# Patient Record
Sex: MALE | Race: WHITE | Employment: FULL TIME | URBAN - METROPOLITAN AREA
[De-identification: names, ages, dates, MRNs, and addresses within clinical notes are randomized per-mention and may not be internally consistent; named-entity substitution may affect disease eponyms.]

---

## 2023-01-03 ENCOUNTER — OFFICE VISIT (OUTPATIENT)
Dept: UROLOGY | Age: 43
End: 2023-01-03

## 2023-01-03 DIAGNOSIS — Z30.09 ENCOUNTER FOR VASECTOMY COUNSELING: Primary | ICD-10-CM

## 2023-01-03 RX ORDER — DIAZEPAM 10 MG/1
TABLET ORAL
Qty: 1 TABLET | Refills: 0 | Status: SHIPPED | OUTPATIENT
Start: 2023-01-03 | End: 2024-01-03

## 2023-01-03 ASSESSMENT — ENCOUNTER SYMPTOMS
BACK PAIN: 0
WHEEZING: 0
VOMITING: 0
COUGH: 0
SHORTNESS OF BREATH: 0
NAUSEA: 0
ABDOMINAL PAIN: 0
INDIGESTION: 0
BLOOD IN STOOL: 0
SKIN LESIONS: 0
CONSTIPATION: 0
EYE DISCHARGE: 0
EYE PAIN: 0
DIARRHEA: 0
HEARTBURN: 0

## 2023-01-03 NOTE — PROGRESS NOTES
[facility]  [address] [facility]    [address]  [place], [address] [facility]  [phone]          Agapito Estevez  : 1980    No chief complaint on file. HPI     Agapito Estevez is a 38 y.o. male referred for desire for sterilization. He is . He has 2 biologic children, ages 6yo and 10yo. He is in a monogamous relationship. Patient is currently using wife's ocp's in regards to birth control. Patient can think of no other instigating or exacerbating events. History reviewed. No pertinent past medical history. History reviewed. No pertinent surgical history. Current Outpatient Medications   Medication Sig Dispense Refill    diazePAM (VALIUM) 10 MG tablet Take 1 tablet by mouth for one dose, 45 minutes prior to vasectomy 1 tablet 0     No current facility-administered medications for this visit. Not on File  Social History     Socioeconomic History    Marital status:      Spouse name: Not on file    Number of children: Not on file    Years of education: Not on file    Highest education level: Not on file   Occupational History    Not on file   Tobacco Use    Smoking status: Not on file    Smokeless tobacco: Not on file   Substance and Sexual Activity    Alcohol use: Not on file    Drug use: Not on file    Sexual activity: Not on file   Other Topics Concern    Not on file   Social History Narrative    Not on file     Social Determinants of Health     Financial Resource Strain: Not on file   Food Insecurity: Not on file   Transportation Needs: Not on file   Physical Activity: Not on file   Stress: Not on file   Social Connections: Not on file   Intimate Partner Violence: Not on file   Housing Stability: Not on file     History reviewed. No pertinent family history. Review of Systems  Constitutional:   Negative for fever, chills, appetite change, malaise/fatigue, headaches and weight loss. Skin:  Negative for skin lesions, rash and itching.   Eyes:  Negative for visual disturbance, eye pain and eye discharge. ENT:  Negative for difficulty articulating words, pain swallowing, high frequency hearing loss and dry mouth. Respiratory:  Negative for cough, blood in sputum, shortness of breath and wheezing. Cardiovascular:  Negative for chest pain, hypertension, irregular heartbeat, leg pain, leg swelling, regular rate and rhythm and varicose veins. GI:  Negative for nausea, vomiting, abdominal pain, blood in stool, constipation, diarrhea, indigestion and heartburn. Genitourinary:  Negative for urinary burning, hematuria, flank pain, recurrent UTIs, history of urolithiasis, nocturia, slower stream, straining, urgency, leakage w/ urge, frequent urination, incomplete emptying, erectile dysfunction, testicular pain, sexually transmitted disease, discharge and urethral stricture. Musculoskeletal:  Negative for back pain, bone pain, arthralgias, tenderness, muscle weakness and neck pain. Neurological:  Negative for dizziness, focal weakness, numbness, seizures and tremors. Psychological:  Negative for depression and psychiatric problem. Endocrine:  Negative for cold intolerance, thirst, excessive urination, fatigue and heat intolerance. Hem/Lymphatic:  Negative for easy bleeding, easy bruising and frequent infections. There were no vitals taken for this visit. GENERAL: NAD, ALERT, A&O x 3, GAIT NORMAL  ABDOMEN: soft, non tender, non distended, + bowel sounds, no organomegaly, no palpable masses  : testes descended bilaterally, no phallic or scrotal lesions, B vas palpable  NEUROLOGIC: cranial nerves 2-12 grossly intact           Assessment and Plan    ICD-10-CM    1. Encounter for vasectomy counseling  Z30.09 diazePAM (VALIUM) 10 MG tablet          I have discussed the risks and benefits of bilateral vasectomy with the patient today. Risks discussed included chance of chronic pain, recanalization, hematoma or bleeding, and infection.   We also discussed that this MUST be considered a permanent procedure as reversal is not always an option or successful. I also stressed that, in terms of birth control, he must not begin having unprotected sexual intercourse until a semen sample has been checked microscopically with no sperm present. We usually check this 1st at week 7 after vasectomy. I prescribed a 10mg Valium tablet to take 45 minutes prior to vasectomy and he has been educated as to the need for a  to and from the procedure.

## 2023-03-15 ENCOUNTER — OFFICE VISIT (OUTPATIENT)
Dept: UROLOGY | Age: 43
End: 2023-03-15

## 2023-03-15 VITALS — WEIGHT: 195 LBS | HEIGHT: 72 IN | BODY MASS INDEX: 26.41 KG/M2

## 2023-03-15 DIAGNOSIS — Z30.2 ENCOUNTER FOR VASECTOMY: Primary | ICD-10-CM

## 2023-03-15 RX ORDER — DIAZEPAM 5 MG/1
10 TABLET ORAL ONCE
COMMUNITY
Start: 2023-01-03

## 2023-03-15 ASSESSMENT — ENCOUNTER SYMPTOMS: NAUSEA: 0

## 2023-03-15 NOTE — PROGRESS NOTES
[facility]  [address] [facility]    [address]  [place], [address] [facility]  [phone]          Hermelinda Rachel  : 1980    No chief complaint on file. HPI     Hermelinda Rachel is a 38 y.o. male          No past medical history on file. No past surgical history on file. Current Outpatient Medications   Medication Sig Dispense Refill    diazePAM (VALIUM) 10 MG tablet Take 1 tablet by mouth for one dose, 45 minutes prior to vasectomy 1 tablet 0     No current facility-administered medications for this visit. Not on File  Social History     Socioeconomic History    Marital status:      Spouse name: Not on file    Number of children: Not on file    Years of education: Not on file    Highest education level: Not on file   Occupational History    Not on file   Tobacco Use    Smoking status: Not on file    Smokeless tobacco: Not on file   Substance and Sexual Activity    Alcohol use: Not on file    Drug use: Not on file    Sexual activity: Not on file   Other Topics Concern    Not on file   Social History Narrative    Not on file     Social Determinants of Health     Financial Resource Strain: Not on file   Food Insecurity: Not on file   Transportation Needs: Not on file   Physical Activity: Not on file   Stress: Not on file   Social Connections: Not on file   Intimate Partner Violence: Not on file   Housing Stability: Not on file     No family history on file. Review of Systems  Constitutional:   Negative for fever. GI:  Negative for nausea. Genitourinary:  Negative for flank pain. Urinalysis  UA - Dipstick  No results found for this or any previous visit. UA - Micro  WBC - 0  RBC - 0  Bacteria - 0  Epith - 0        Assessment and Plan  No diagnosis found.

## 2023-03-15 NOTE — PROGRESS NOTES
[facility]  [address] [place] [address] Way  [address] [facility], [address] [facility]  [phone]    Ana Pop  : 1980         HPI   38 y.o., male referred for desire for sterilization. He is . He has 2 biologic children, ages 8yo and 10yo. He is in a monogamous relationship. Patient is currently using wife's ocp's in regards to birth control. History reviewed. No pertinent past medical history. History reviewed. No pertinent surgical history. Current Outpatient Medications   Medication Sig Dispense Refill    diazePAM (VALIUM) 10 MG tablet Take 1 tablet by mouth for one dose, 45 minutes prior to vasectomy 1 tablet 0    diazePAM (VALIUM) 5 MG tablet Take 10 mg by mouth once. No current facility-administered medications for this visit. Allergies   Allergen Reactions    Penicillins      Other reaction(s): Hives/Swelling-Allergy     Social History     Socioeconomic History    Marital status:      Spouse name: Not on file    Number of children: Not on file    Years of education: Not on file    Highest education level: Not on file   Occupational History    Not on file   Tobacco Use    Smoking status: Not on file    Smokeless tobacco: Not on file   Substance and Sexual Activity    Alcohol use: Not on file    Drug use: Not on file    Sexual activity: Not on file   Other Topics Concern    Not on file   Social History Narrative    Not on file     Social Determinants of Health     Financial Resource Strain: Not on file   Food Insecurity: Not on file   Transportation Needs: Not on file   Physical Activity: Not on file   Stress: Not on file   Social Connections: Not on file   Intimate Partner Violence: Not on file   Housing Stability: Not on file     History reviewed. No pertinent family history. Ht 6' (1.829 m)   Wt 195 lb (88.5 kg)   BMI 26.45 kg/m²       Vasectomy Procedure Note    The scrotum was cleansed with warm betadine and draped in the usual sterile manner.      RIGHT vas was isolated from other spermatic cord structures in the high RIGHT marycruz-scrotum above the RIGHT testis. Lidocaine without epinephrine was injected for anesthesia. A small incision was made using scalpel. The vas and sheath was brought above the level of the skin using the vas loop grasper. The vas was freed from surrounding tissue using dissecting sharp vas hemostat. 2 small titanium clips were applied (1 proximal, 1 distal) and the portion of the vas between the clips was removed. Cautery was then applied to both ends of vas. No bleeding noted and vas ends released back into the scrotal sac. This was then performed identically on the LEFT. Both of the small incisions were then closed using 1 interrupted chromic stitch. ICD-10-CM    1. Encounter for vasectomy  Z30.2           Expectation/Instruction sheet was given. He will bring a semen specimen in 7 weeks or so. He knows to NOT have unprotected intercourse until told he is sterile. He knows to call if he has issues/concerns.

## 2023-06-05 ENCOUNTER — OFFICE VISIT (OUTPATIENT)
Dept: UROLOGY | Age: 43
End: 2023-06-05

## 2023-06-05 DIAGNOSIS — Z98.52 S/P VASECTOMY: Primary | ICD-10-CM
